# Patient Record
Sex: FEMALE | Race: WHITE | NOT HISPANIC OR LATINO | Employment: FULL TIME | URBAN - METROPOLITAN AREA
[De-identification: names, ages, dates, MRNs, and addresses within clinical notes are randomized per-mention and may not be internally consistent; named-entity substitution may affect disease eponyms.]

---

## 2018-07-02 ENCOUNTER — APPOINTMENT (OUTPATIENT)
Dept: CT IMAGING | Facility: HOSPITAL | Age: 28
End: 2018-07-02

## 2018-07-02 ENCOUNTER — HOSPITAL ENCOUNTER (EMERGENCY)
Facility: HOSPITAL | Age: 28
Discharge: HOME | End: 2018-07-02
Attending: EMERGENCY MEDICINE | Admitting: EMERGENCY MEDICINE

## 2018-07-02 VITALS
SYSTOLIC BLOOD PRESSURE: 117 MMHG | DIASTOLIC BLOOD PRESSURE: 78 MMHG | RESPIRATION RATE: 16 BRPM | TEMPERATURE: 99.3 F | BODY MASS INDEX: 27.74 KG/M2 | HEART RATE: 94 BPM | WEIGHT: 183 LBS | OXYGEN SATURATION: 95 % | HEIGHT: 68 IN

## 2018-07-02 DIAGNOSIS — G43.009 MIGRAINE WITHOUT AURA AND WITHOUT STATUS MIGRAINOSUS, NOT INTRACTABLE: Primary | ICD-10-CM

## 2018-07-02 LAB
HOLD SPECIMEN: NORMAL
HOLD SPECIMEN: NORMAL
WHOLE BLOOD HOLD SPECIMEN: NORMAL
WHOLE BLOOD HOLD SPECIMEN: NORMAL

## 2018-07-02 PROCEDURE — 70450 CT HEAD/BRAIN W/O DYE: CPT

## 2018-07-02 PROCEDURE — 25010000002 KETOROLAC TROMETHAMINE PER 15 MG: Performed by: PHYSICIAN ASSISTANT

## 2018-07-02 PROCEDURE — 99284 EMERGENCY DEPT VISIT MOD MDM: CPT

## 2018-07-02 PROCEDURE — 96375 TX/PRO/DX INJ NEW DRUG ADDON: CPT

## 2018-07-02 PROCEDURE — 25010000002 METOCLOPRAMIDE PER 10 MG: Performed by: PHYSICIAN ASSISTANT

## 2018-07-02 PROCEDURE — 96374 THER/PROPH/DIAG INJ IV PUSH: CPT

## 2018-07-02 PROCEDURE — 25010000002 DIPHENHYDRAMINE PER 50 MG: Performed by: PHYSICIAN ASSISTANT

## 2018-07-02 PROCEDURE — 25010000002 DEXAMETHASONE: Performed by: PHYSICIAN ASSISTANT

## 2018-07-02 RX ORDER — METOCLOPRAMIDE HYDROCHLORIDE 5 MG/ML
10 INJECTION INTRAMUSCULAR; INTRAVENOUS ONCE
Status: COMPLETED | OUTPATIENT
Start: 2018-07-02 | End: 2018-07-02

## 2018-07-02 RX ORDER — DIPHENHYDRAMINE HYDROCHLORIDE 50 MG/ML
25 INJECTION INTRAMUSCULAR; INTRAVENOUS ONCE
Status: COMPLETED | OUTPATIENT
Start: 2018-07-02 | End: 2018-07-02

## 2018-07-02 RX ORDER — KETOROLAC TROMETHAMINE 30 MG/ML
30 INJECTION, SOLUTION INTRAMUSCULAR; INTRAVENOUS ONCE
Status: COMPLETED | OUTPATIENT
Start: 2018-07-02 | End: 2018-07-02

## 2018-07-02 RX ADMIN — DIPHENHYDRAMINE HYDROCHLORIDE 25 MG: 50 INJECTION INTRAMUSCULAR; INTRAVENOUS at 12:59

## 2018-07-02 RX ADMIN — KETOROLAC TROMETHAMINE 30 MG: 30 INJECTION, SOLUTION INTRAMUSCULAR at 13:00

## 2018-07-02 RX ADMIN — METOCLOPRAMIDE 10 MG: 5 INJECTION, SOLUTION INTRAMUSCULAR; INTRAVENOUS at 13:01

## 2018-07-02 RX ADMIN — DEXAMETHASONE SODIUM PHOSPHATE 10 MG: 10 INJECTION INTRAMUSCULAR; INTRAVENOUS at 13:04

## 2018-07-02 RX ADMIN — SODIUM CHLORIDE 1000 ML: 9 INJECTION, SOLUTION INTRAVENOUS at 12:58

## 2018-07-02 NOTE — ED PROVIDER NOTES
Subjective   Patient comes emergency part today complaining having a headache.  Patient reports the headache started about 2 days ago.  As kept her from sleeping at night.  She went to the [facility] was sent to the emergency department for evaluation.  Patient has had some nausea no vomiting.  She's had no photophobia and no phonophobia.  Patient has no other complaints.  Patient has had no prior history of migraine headaches.  She's had no head trauma.  No fevers no chills.  No other complaints        History provided by:  Patient   used: No    Migraine   Pain location:  Frontal  Quality:  Dull  Radiates to:  Does not radiate  Severity currently:  9/10  Severity at highest:  9/10  Onset quality:  Gradual  Duration:  3 days  Timing:  Constant  Progression:  Worsening  Chronicity:  New  Similar to prior headaches: no    Context: activity    Context: not caffeine, not stress, not exposure to cold air, not loud noise and not straining    Relieved by:  Nothing  Worsened by:  Activity and sound  Ineffective treatments:  None tried  Associated symptoms: no abdominal pain, no blurred vision, no congestion, no cough, no diarrhea, no dizziness, no drainage, no fatigue, no focal weakness, no hearing loss, no neck pain, no numbness, no paresthesias, no photophobia, no sinus pressure, no swollen glands and no weakness        Review of Systems   Constitutional: Negative for fatigue.   HENT: Negative for congestion, hearing loss, postnasal drip and sinus pressure.    Eyes: Negative for blurred vision and photophobia.   Respiratory: Negative for cough.    Gastrointestinal: Negative for abdominal pain and diarrhea.   Genitourinary: Negative for dysuria and urgency.   Musculoskeletal: Negative for neck pain.   Neurological: Negative for dizziness, focal weakness, weakness, numbness and paresthesias.   Hematological: Negative for adenopathy.   Psychiatric/Behavioral: Negative.    All other systems reviewed and are  negative.      History reviewed. No pertinent past medical history.    No Known Allergies    History reviewed. No pertinent surgical history.    History reviewed. No pertinent family history.    Social History     Social History   • Marital status:      Social History Main Topics   • Smoking status: Current Every Day Smoker     Packs/day: 0.50     Types: Cigarettes   • Alcohol use No   • Drug use: No   • Sexual activity: Yes     Birth control/ protection: None     Other Topics Concern   • Not on file           Objective   Physical Exam   Constitutional: She is oriented to person, place, and time. She appears well-developed and well-nourished.   HENT:   Head: Normocephalic and atraumatic.   Right Ear: External ear normal.   Left Ear: External ear normal.   Nose: Nose normal.   Mouth/Throat: Oropharynx is clear and moist.   Eyes: Conjunctivae and EOM are normal. Pupils are equal, round, and reactive to light. No scleral icterus.   Neck: Normal range of motion. No thyromegaly present.   Cardiovascular: Normal rate, regular rhythm and normal heart sounds.    Pulmonary/Chest: Effort normal and breath sounds normal. No respiratory distress. She has no wheezes. She has no rales. She exhibits no tenderness.   Abdominal: Soft. Bowel sounds are normal. She exhibits no distension. There is no tenderness.   Musculoskeletal: Normal range of motion.   Lymphadenopathy:     She has no cervical adenopathy.   Neurological: She is alert and oriented to person, place, and time. She has normal reflexes. She displays normal reflexes. No cranial nerve deficit. Coordination normal.   Skin: Skin is warm and dry. Capillary refill takes less than 2 seconds.   Psychiatric: She has a normal mood and affect. Her behavior is normal. Judgment and thought content normal.   Nursing note and vitals reviewed.      Procedures           ED Course  ED Course as of Jul 07 1301   Mon Jul 02, 2018   1411 Patient has improved headache.  Since she like  to be discharged discharged home.  Discussed the CT scan findings.  [ELIZA]      ED Course User Index  [ELIZA] MICHELLE Walsh                  MDM  Number of Diagnoses or Management Options  Migraine without aura and without status migrainosus, not intractable: new and requires workup     Amount and/or Complexity of Data Reviewed  Clinical lab tests: reviewed and ordered  Tests in the radiology section of CPT®: reviewed and ordered  Tests in the medicine section of CPT®: ordered and reviewed  Discuss the patient with other providers: yes    Patient Progress  Patient progress: stable        Final diagnoses:   Migraine without aura and without status migrainosus, not intractable            MICHELLE Walsh  07/07/18 6333

## 2018-07-02 NOTE — DISCHARGE INSTRUCTIONS
Follow up with one of the [facility] physician groups below to setup primary care. If you have trouble following up, please call Fany Lynn, our transitional care nurse, at [phone].    (Dr. Edgar, Dr. Salguero, Dr. Corea, and Dr. Bull.)  [facility], Primary Care, [phone], [address], [place] [address]    [facility], Primary Care, [phone], [address] [place], [address] [facility]) [facility], Primary Care, [phone], [address], [address] [place], [address] [facility], Primary Care, [phone], [address], [address] [place], [address]     [place] [address] [facility], Primary Care, [phone], [address], [address] [place], [address]    [place] [address] [facility], Primary Care, [phone], [address]    [place], [address] [facility], Primary Care, [phone], [address], [address] [place], [address] [facility], Primary Care, [phone], [address], [address] [place], [address] [facility]) [facility], Primary Care, [phone], [address], [address] [place], [address] [facility], Primary Care, [phone], [address], [address] [place], [address] [facility], Primary Care, [phone], [address], [place], [address]     [facility], Primary Care, [phone], [address] [id number]

## 2020-03-01 ENCOUNTER — OFFICE VISIT (OUTPATIENT)
Dept: RETAIL CLINIC | Facility: CLINIC | Age: 30
End: 2020-03-01

## 2020-03-01 VITALS
OXYGEN SATURATION: 98 % | HEART RATE: 91 BPM | RESPIRATION RATE: 15 BRPM | BODY MASS INDEX: 30.92 KG/M2 | DIASTOLIC BLOOD PRESSURE: 80 MMHG | TEMPERATURE: 97.9 F | HEIGHT: 68 IN | SYSTOLIC BLOOD PRESSURE: 124 MMHG | WEIGHT: 204 LBS

## 2020-03-01 DIAGNOSIS — H92.01 RIGHT EAR PAIN: ICD-10-CM

## 2020-03-01 DIAGNOSIS — Z87.09 HISTORY OF INFLUENZA: Primary | ICD-10-CM

## 2020-03-01 PROCEDURE — 99213 OFFICE O/P EST LOW 20 MIN: CPT | Performed by: NURSE PRACTITIONER

## 2020-03-01 RX ORDER — OSELTAMIVIR PHOSPHATE 75 MG/1
CAPSULE ORAL
COMMUNITY
Start: 2020-02-28

## 2020-03-01 RX ORDER — AZITHROMYCIN 250 MG/1
TABLET, FILM COATED ORAL
Qty: 6 TABLET | Refills: 0 | Status: SHIPPED | OUTPATIENT
Start: 2020-03-01

## 2020-03-01 RX ORDER — PREDNISONE 10 MG/1
TABLET ORAL
Qty: 21 TABLET | Refills: 0 | Status: SHIPPED | OUTPATIENT
Start: 2020-03-01

## 2020-03-01 NOTE — PATIENT INSTRUCTIONS
Earache, Adult  An earache, or ear pain, can be caused by many things, including:  · An infection.  · Ear wax buildup.  · Ear pressure.  · Something in the ear that should not be there (foreign body).  · A sore throat.  · Tooth problems.  · Jaw problems.  Treatment of the earache will depend on the cause. If the cause is not clear or cannot be determined, you may need to watch your symptoms until your earache goes away or until a cause is found.  Follow these instructions at home:  Pay attention to any changes in your symptoms. Take these actions to help with your pain:  · Take or apply over-the-counter and prescription medicines only as told by your health care provider.  · If you were prescribed an antibiotic medicine, use it as told by your health care provider. Do not stop using the antibiotic even if you start to feel better.  · Do not put anything in your ear other than medicine that is prescribed by your health care provider.  · If directed, apply heat to the affected area as often as told by your health care provider. Use the heat source that your health care provider recommends, such as a moist heat pack or a heating pad.  ? Place a towel between your skin and the heat source.  ? Leave the heat on for 20-30 minutes.  ? Remove the heat if your skin turns bright red. This is especially important if you are unable to feel pain, heat, or cold. You may have a greater risk of getting burned.  · If directed, put ice on the ear:  ? Put ice in a plastic bag.  ? Place a towel between your skin and the bag.  ? Leave the ice on for 20 minutes, 2-3 times a day.  · Try resting in an upright position instead of lying down. This may help to reduce pressure in your ear and relieve pain.  · Chew gum if it helps to relieve your ear pain.  · Treat any allergies as told by your health care provider.  · Keep all follow-up visits as told by your health care provider. This is important.  Contact a health care provider if:  · Your  "pain does not improve within 2 days.  · Your earache gets worse.  · You have new symptoms.  · You have a fever.  Get help right away if:  · You have a severe headache.  · You have a stiff neck.  · You have trouble swallowing.  · You have redness or swelling behind your ear.  · You have fluid or blood coming from your ear.  · You have hearing loss.  · You feel dizzy.  This information is not intended to replace advice given to you by your health care provider. Make sure you discuss any questions you have with your health care provider.  Document Released: 08/04/2005 Document Revised: 08/15/2017 Document Reviewed: 06/12/2017  [employer] Interactive Patient Education © 2020 [employer]    Influenza, Adult  Influenza, more commonly known as \"the flu,\" is a viral infection that mainly affects the respiratory tract. The respiratory tract includes organs that help you breathe, such as the lungs, nose, and throat. The flu causes many symptoms similar to the common cold along with high fever and body aches.  The flu spreads easily from person to person (is contagious). Getting a flu shot (influenza vaccination) every year is the best way to prevent the flu.  What are the causes?  This condition is caused by the influenza virus. You can get the virus by:  · Breathing in droplets that are in the air from an infected person's cough or sneeze.  · Touching something that has been exposed to the virus (has been contaminated) and then touching your mouth, nose, or eyes.  What increases the risk?  The following factors may make you more likely to get the flu:  · Not washing or sanitizing your hands often.  · Having close contact with many people during cold and flu season.  · Touching your mouth, eyes, or nose without first washing or sanitizing your hands.  · Not getting a yearly (annual) flu shot.  You may have a higher risk for the flu, including serious problems such as a lung infection (pneumonia), if you:  · Are older than " 65.  · Are pregnant.  · Have a weakened disease-fighting system (immune system). You may have a weakened immune system if you:  ? Have HIV or AIDS.  ? Are undergoing chemotherapy.  ? Are taking medicines that reduce (suppress) the activity of your immune system.  · Have a long-term (chronic) illness, such as heart disease, kidney disease, diabetes, or lung disease.  · Have a liver disorder.  · Are severely overweight (morbidly obese).  · Have anemia. This is a condition that affects your red blood cells.  · Have asthma.  What are the signs or symptoms?  Symptoms of this condition usually begin suddenly and last 4-14 days. They may include:  · Fever and chills.  · Headaches, body aches, or muscle aches.  · Sore throat.  · Cough.  · Runny or stuffy (congested) nose.  · Chest discomfort.  · Poor appetite.  · Weakness or fatigue.  · Dizziness.  · Nausea or vomiting.  How is this diagnosed?  This condition may be diagnosed based on:  · Your symptoms and medical history.  · A physical exam.  · Swabbing your nose or throat and testing the fluid for the influenza virus.  How is this treated?  If the flu is diagnosed early, you can be treated with medicine that can help reduce how severe the illness is and how long it lasts (antiviral medicine). This may be given by mouth (orally) or through an IV.  Taking care of yourself at home can help relieve symptoms. Your health care provider may recommend:  · Taking over-the-counter medicines.  · Drinking plenty of fluids.  In many cases, the flu goes away on its own. If you have severe symptoms or complications, you may be treated in a hospital.  Follow these instructions at home:  Activity  · Rest as needed and get plenty of sleep.  · Stay home from work or school as told by your health care provider. Unless you are visiting your health care provider, avoid leaving home until your fever has been gone for 24 hours without taking medicine.  Eating and drinking  · Take an oral  rehydration solution (ORS). This is a drink that is sold at pharmacies and retail stores.  · Drink enough fluid to keep your urine pale yellow.  · Drink clear fluids in small amounts as you are able. Clear fluids include water, ice chips, diluted fruit juice, and low-calorie sports drinks.  · Eat bland, easy-to-digest foods in small amounts as you are able. These foods include bananas, applesauce, rice, lean meats, toast, and crackers.  · Avoid drinking fluids that contain a lot of sugar or caffeine, such as energy drinks, regular sports drinks, and soda.  · Avoid alcohol.  · Avoid spicy or fatty foods.  General instructions         · Take over-the-counter and prescription medicines only as told by your health care provider.  · Use a cool mist humidifier to add humidity to the air in your home. This can make it easier to breathe.  · Cover your mouth and nose when you cough or sneeze.  · Wash your hands with soap and water often, especially after you cough or sneeze. If soap and water are not available, use alcohol-based hand .  · Keep all follow-up visits as told by your health care provider. This is important.  How is this prevented?    · Get an annual flu shot. You may get the flu shot in late summer, fall, or winter. Ask your health care provider when you should get your flu shot.  · Avoid contact with people who are sick during cold and flu season. This is generally fall and winter.  Contact a health care provider if:  · You develop new symptoms.  · You have:  ? Chest pain.  ? Diarrhea.  ? A fever.  · Your cough gets worse.  · You produce more mucus.  · You feel nauseous or you vomit.  Get help right away if:  · You develop shortness of breath or difficulty breathing.  · Your skin or nails turn a bluish color.  · You have severe pain or stiffness in your neck.  · You develop a sudden headache or sudden pain in your face or ear.  · You cannot eat or drink without vomiting.  Summary  · Influenza, more  "commonly known as \"the flu,\" is a viral infection that primarily affects your respiratory tract.  · Symptoms of the flu usually begin suddenly and last 4-14 days.  · Getting an annual flu shot is the best way to prevent getting the flu.  · Stay home from work or school as told by your health care provider. Unless you are visiting your health care provider, avoid leaving home until your fever has been gone for 24 hours without taking medicine.  · Keep all follow-up visits as told by your health care provider. This is important.  This information is not intended to replace advice given to you by your health care provider. Make sure you discuss any questions you have with your health care provider.  Document Released: 12/15/2001 Document Revised: 06/05/2019 Document Reviewed: 06/05/2019  [employer] Interactive Patient Education © 2020 [employer]    "

## 2020-03-01 NOTE — PROGRESS NOTES
"Subjective   Adelina Dennis is a 29 y.o. female.   /80   Pulse 91   Temp 97.9 °F (36.6 °C)   Resp 15   Ht 172.7 cm (68\")   Wt 92.5 kg (204 lb)   LMP 02/02/2020   SpO2 98%   BMI 31.02 kg/m²   History reviewed. No pertinent past medical history.  No Known Allergies      Earache    There is pain in the right ear. This is a new problem. Episode onset: 2 days ago. The problem has been rapidly worsening. The pain is moderate. Associated symptoms include coughing (mild, non productive), headaches and rhinorrhea (green color, thick). Pertinent negatives include no abdominal pain, diarrhea, ear discharge, hearing loss, neck pain, rash, sore throat or vomiting.        The following portions of the patient's history were reviewed and updated as appropriate: allergies, current medications, past family history, past medical history, past social history, past surgical history and problem list.    Review of Systems   Constitutional: Positive for fatigue and fever (resolved).   HENT: Positive for ear pain and rhinorrhea (green color, thick). Negative for ear discharge, hearing loss and sore throat.    Respiratory: Positive for cough (mild, non productive).    Gastrointestinal: Negative for abdominal pain, diarrhea and vomiting.   Musculoskeletal: Negative for neck pain.   Skin: Negative for rash.   Neurological: Positive for headaches.   + test for influenza 3 days ago, currently on tamiflu    Objective   Physical Exam   Constitutional: She appears well-developed and well-nourished.  Non-toxic appearance. She appears ill (mild).   HENT:   Head: Normocephalic and atraumatic.   Right Ear: Tympanic membrane and ear canal normal.   Left Ear: Tympanic membrane and ear canal normal.   Nose: Mucosal edema and rhinorrhea present. Right sinus exhibits no maxillary sinus tenderness and no frontal sinus tenderness. Left sinus exhibits no maxillary sinus tenderness and no frontal sinus tenderness.   Mouth/Throat: Uvula is " midline. Posterior oropharyngeal erythema present. No tonsillar exudate.   Cardiovascular: Regular rhythm and normal heart sounds.   Pulmonary/Chest: Effort normal. She has no wheezes. She has rhonchi. She has no rales.   Lymphadenopathy:     She has no cervical adenopathy.   Skin: Skin is warm and dry.       Assessment/Plan   Adelina was seen today for earache.    Diagnoses and all orders for this visit:    History of influenza    Right ear pain    Other orders  -     predniSONE (DELTASONE) 10 MG tablet; 6/5/4/3/2/1 As directed PO  -     azithromycin (ZITHROMAX Z-THERESA) 250 MG tablet; Take 2 tablets the first day, then 1 tablet daily for 4 days.      Pt advised to start the prednisone today. ONLY start the antibiotic is symptoms linger and worsen after 3 days on steroid. Pt states understanding.

## 2025-02-14 ENCOUNTER — OFFICE VISIT (OUTPATIENT)
Dept: FAMILY MEDICINE CLINIC | Facility: CLINIC | Age: 35
End: 2025-02-14
Payer: COMMERCIAL

## 2025-02-14 VITALS
HEART RATE: 98 BPM | WEIGHT: 157.6 LBS | HEIGHT: 68 IN | SYSTOLIC BLOOD PRESSURE: 122 MMHG | DIASTOLIC BLOOD PRESSURE: 78 MMHG | TEMPERATURE: 97.9 F | OXYGEN SATURATION: 98 % | BODY MASS INDEX: 23.89 KG/M2 | RESPIRATION RATE: 18 BRPM

## 2025-02-14 DIAGNOSIS — Z76.89 ENCOUNTER TO ESTABLISH CARE: ICD-10-CM

## 2025-02-14 DIAGNOSIS — Z00.00 ANNUAL PHYSICAL EXAM: Primary | ICD-10-CM

## 2025-02-14 PROCEDURE — 99385 PREV VISIT NEW AGE 18-39: CPT | Performed by: NURSE PRACTITIONER

## 2025-02-14 PROCEDURE — 2014F MENTAL STATUS ASSESS: CPT | Performed by: NURSE PRACTITIONER

## 2025-02-14 PROCEDURE — 1160F RVW MEDS BY RX/DR IN RCRD: CPT | Performed by: NURSE PRACTITIONER

## 2025-02-14 PROCEDURE — 1159F MED LIST DOCD IN RCRD: CPT | Performed by: NURSE PRACTITIONER

## 2025-02-14 NOTE — PROGRESS NOTES
"    Annual Physical     Name: Adelina Dennis    : 1990     MRN: [id number]     Chief Complaint  Annual Exam    Subjective     History of Present Illness:  Adelina Dennis is a 34 y.o. female who presents today to establish care with annual physical exam.  Patient has no pre-existing conditions.  Over the last several years she has had a total weight loss of approximately 75 pounds through dietary modification and daily exercise.  She was a former smoker, achieving cessation 4 years ago.  Patient states up-to-date with annual cervical cancer screening through Pap smear.  Does not have any high risk familial cancers, no ovarian, uterine, breast or colon cancer risk.  She has no complaints or concerns today    The patient is being seen for a health maintenance evaluation.    Social History  Tobacco Use: Medium Risk (2025)    Patient History     Smoking Tobacco Use: Former     Smokeless Tobacco Use: Never     Passive Exposure: Not on file     Social History     Socioeconomic History    Marital status:    Tobacco Use    Smoking status: Former     Average packs/day: 0.5 packs/day for 11.0 years (5.5 ttl pk-yrs)     Types: Cigarettes     Start date: 3/11/2010    Smokeless tobacco: Never   Vaping Use    Vaping status: Never Used   Substance and Sexual Activity    Alcohol use: No    Drug use: No    Sexual activity: Yes     Birth control/protection: None       General History  Adelina  does have regular dental visits.  She does not complain of vision problems. Last eye exam was May 2024.  Immunizations are up to date.  Lifestyle  Adelina  consumes in general, a \"healthy\" diet  .  She exercises daily.    Reproductive Health  She reports periods are regular every 28-30 days.  She is sexually active. Her contraceptive plan is no method.    Screening  Last pap was   Last Completed Pap Smear            PAP SMEAR (Every 3 Years) Next due on 2027  Done                . " History of abnormal pap smear or family history of gyn cancer: no  Last mammogram was   Last Completed Mammogram       This patient has no relevant Health Maintenance data.        . Personal or family history of abnormal mammograms or breast cancer: no  Last colonoscopy was   Last Completed Colonoscopy       This patient has no relevant Health Maintenance data.        . Family history of colon cancer: no      Health Maintenance Summary            Ordered - HEPATITIS C SCREENING (Once) Ordered on 2/14/2025      No completion, postpone, or frequency change history exists for this topic.              Overdue - ANNUAL PHYSICAL (Yearly) Never done      No completion, postpone, or frequency change history exists for this topic.              Postponed - INFLUENZA VACCINE (Yearly - July to March) Postponed until 3/31/2025      02/14/2025  Postponed until 3/31/2025 by Debbie Bay MA (Patient Refused)    09/30/2022  Imm Admin: Influenza Injectable Mdck Pf Quad    10/06/2020  Imm Admin: Fluzone (or Fluarix & Flulaval for VFC) >6mos    09/27/2019  Imm Admin: Fluzone (or Fluarix & Flulaval for VFC) >6mos    11/02/2017  Imm Admin: Influenza Seasonal Injectable    Only the first 5 history entries have been loaded, but more history exists.              Postponed - COVID-19 Vaccine (1 - 2024-25 season) Postponed until 2/4/2026 02/14/2025  Postponed until 2/4/2026 by Debbie Bay MA (Patient Refused)              TDAP/TD VACCINES (3 - Td or Tdap) Next due on 8/9/2027 08/09/2017  Imm Admin: Tdap    08/19/2016  Imm Admin: Tdap              PAP SMEAR (Every 3 Years) Next due on 12/13/2027 12/13/2024  Done              Pneumococcal Vaccine 0-49 (Series Information) Aged Out      No completion, postpone, or frequency change history exists for this topic.                  Immunization History   Administered Date(s) Administered    Fluzone (or Fluarix & Flulaval for VFC) >6mos 09/02/2016, 09/27/2019, 10/06/2020     "Hepatitis A 04/16/2019    Influenza Injectable Mdck Pf Quad 09/30/2022    Influenza Seasonal Injectable 11/02/2017    Tdap 08/19/2016, 08/09/2017       Review of Systems   Constitutional:  Negative for fatigue.   Respiratory:  Negative for cough and shortness of breath.    Cardiovascular:  Negative for chest pain, palpitations and leg swelling.   Gastrointestinal:  Negative for abdominal pain, constipation, diarrhea, nausea and vomiting.   Musculoskeletal:  Negative for arthralgias and myalgias.   Neurological:  Negative for dizziness, weakness, light-headedness and headaches.   Hematological:  Does not bruise/bleed easily.   Psychiatric/Behavioral:  Negative for agitation, self-injury, sleep disturbance and suicidal ideas. The patient is not nervous/anxious.        Objective     Vital Signs  /78 (BP Location: Left arm, Patient Position: Sitting, Cuff Size: Adult)   Pulse 98   Temp 97.9 °F (36.6 °C) (Temporal)   Resp 18   Ht 172.7 cm (68\")   Wt 71.5 kg (157 lb 9.6 oz)   SpO2 98%   BMI 23.96 kg/m²   Estimated body mass index is 23.96 kg/m² as calculated from the following:    Height as of this encounter: 172.7 cm (68\").    Weight as of this encounter: 71.5 kg (157 lb 9.6 oz).    BMI is within normal parameters. No other follow-up for BMI required.      Physical Exam  Vitals reviewed.   Constitutional:       Appearance: Normal appearance.   HENT:      Head: Normocephalic and atraumatic.      Right Ear: Tympanic membrane, ear canal and external ear normal.      Left Ear: Ear canal and external ear normal.      Nose: Nose normal.      Mouth/Throat:      Mouth: Mucous membranes are moist.      Pharynx: Oropharynx is clear.   Eyes:      Conjunctiva/sclera: Conjunctivae normal.      Pupils: Pupils are equal, round, and reactive to light.   Cardiovascular:      Rate and Rhythm: Normal rate and regular rhythm.      Pulses: Normal pulses.      Heart sounds: Normal heart sounds.   Pulmonary:      Effort: Pulmonary " effort is normal.      Breath sounds: Normal breath sounds.   Abdominal:      General: Bowel sounds are normal.      Palpations: Abdomen is soft.      Tenderness: There is abdominal tenderness. There is no guarding.   Genitourinary:     Comments: Defer to GYN  Musculoskeletal:         General: Normal range of motion.      Cervical back: Neck supple.   Skin:     General: Skin is warm and dry.   Neurological:      Mental Status: She is alert and oriented to person, place, and time.   Psychiatric:         Mood and Affect: Mood normal.         Behavior: Behavior normal.          Assessment and Plan     Diagnoses and all orders for this visit:    1. Annual physical exam (Primary)  Assessment & Plan:  Patient not fasting today, will return on Monday for comprehensive lab work including CBC, CMP, TSH with reflex T4 and lipid panel.  Patient has excellent blood pressure, 122/78 in office today.  BMI is normal at 23.9.  Patient exercises daily both weightlifting and cardio.  She eats a predominantly healthy diet.  He has no concerns or complaints today    Orders:  -     CBC & Differential; Future  -     Comprehensive Metabolic Panel; Future  -     Lipid Panel; Future  -     Hepatitis C Antibody; Future  -     TSH Rfx On Abnormal To Free T4; Future    2. Encounter to establish care        Follow Up  Return in about 1 year (around 2/14/2026) for Annual physical.    Natalya Waddell, APRN

## 2025-02-14 NOTE — ASSESSMENT & PLAN NOTE
Patient not fasting today, will return on Monday for comprehensive lab work including CBC, CMP, TSH with reflex T4 and lipid panel.  Patient has excellent blood pressure, 122/78 in office today.  BMI is normal at 23.9.  Patient exercises daily both weightlifting and cardio.  She eats a predominantly healthy diet.  He has no concerns or complaints today

## 2025-02-17 ENCOUNTER — CLINICAL SUPPORT (OUTPATIENT)
Dept: FAMILY MEDICINE CLINIC | Facility: CLINIC | Age: 35
End: 2025-02-17
Payer: COMMERCIAL

## 2025-02-17 DIAGNOSIS — Z00.00 ANNUAL PHYSICAL EXAM: ICD-10-CM

## 2025-02-17 PROCEDURE — 36415 COLL VENOUS BLD VENIPUNCTURE: CPT | Performed by: NURSE PRACTITIONER

## 2025-02-18 LAB
ALBUMIN SERPL-MCNC: 4.3 G/DL (ref 3.9–4.9)
ALP SERPL-CCNC: 51 IU/L (ref 44–121)
ALT SERPL-CCNC: 15 IU/L (ref 0–32)
AST SERPL-CCNC: 17 IU/L (ref 0–40)
BASOPHILS # BLD AUTO: 0.1 X10E3/UL (ref 0–0.2)
BASOPHILS NFR BLD AUTO: 1 %
BILIRUB SERPL-MCNC: 0.3 MG/DL (ref 0–1.2)
BUN SERPL-MCNC: 14 MG/DL (ref 6–20)
BUN/CREAT SERPL: 16 (ref 9–23)
CALCIUM SERPL-MCNC: 8.9 MG/DL (ref 8.7–10.2)
CHLORIDE SERPL-SCNC: 105 MMOL/L (ref 96–106)
CHOLEST SERPL-MCNC: 142 MG/DL (ref 100–199)
CO2 SERPL-SCNC: 20 MMOL/L (ref 20–29)
CREAT SERPL-MCNC: 0.9 MG/DL (ref 0.57–1)
EGFRCR SERPLBLD CKD-EPI 2021: 87 ML/MIN/1.73
EOSINOPHIL # BLD AUTO: 0.4 X10E3/UL (ref 0–0.4)
EOSINOPHIL NFR BLD AUTO: 6 %
ERYTHROCYTE [DISTWIDTH] IN BLOOD BY AUTOMATED COUNT: 11.5 % (ref 11.7–15.4)
GLOBULIN SER CALC-MCNC: 1.9 G/DL (ref 1.5–4.5)
GLUCOSE SERPL-MCNC: 77 MG/DL (ref 70–99)
HCT VFR BLD AUTO: 42.3 % (ref 34–46.6)
HCV IGG SERPL QL IA: NON REACTIVE
HDLC SERPL-MCNC: 56 MG/DL
HGB BLD-MCNC: 13.8 G/DL (ref 11.1–15.9)
IMM GRANULOCYTES # BLD AUTO: 0 X10E3/UL (ref 0–0.1)
IMM GRANULOCYTES NFR BLD AUTO: 0 %
LDLC SERPL CALC-MCNC: 76 MG/DL (ref 0–99)
LYMPHOCYTES # BLD AUTO: 1.7 X10E3/UL (ref 0.7–3.1)
LYMPHOCYTES NFR BLD AUTO: 30 %
MCH RBC QN AUTO: 30.5 PG (ref 26.6–33)
MCHC RBC AUTO-ENTMCNC: 32.6 G/DL (ref 31.5–35.7)
MCV RBC AUTO: 93 FL (ref 79–97)
MONOCYTES # BLD AUTO: 0.4 X10E3/UL (ref 0.1–0.9)
MONOCYTES NFR BLD AUTO: 6 %
NEUTROPHILS # BLD AUTO: 3.1 X10E3/UL (ref 1.4–7)
NEUTROPHILS NFR BLD AUTO: 57 %
PLATELET # BLD AUTO: 223 X10E3/UL (ref 150–450)
POTASSIUM SERPL-SCNC: 4.3 MMOL/L (ref 3.5–5.2)
PROT SERPL-MCNC: 6.2 G/DL (ref 6–8.5)
RBC # BLD AUTO: 4.53 X10E6/UL (ref 3.77–5.28)
SODIUM SERPL-SCNC: 140 MMOL/L (ref 134–144)
TRIGL SERPL-MCNC: 46 MG/DL (ref 0–149)
TSH SERPL DL<=0.005 MIU/L-ACNC: 0.69 UIU/ML (ref 0.45–4.5)
VLDLC SERPL CALC-MCNC: 10 MG/DL (ref 5–40)
WBC # BLD AUTO: 5.6 X10E3/UL (ref 3.4–10.8)

## 2025-02-19 ENCOUNTER — TELEPHONE (OUTPATIENT)
Dept: FAMILY MEDICINE CLINIC | Facility: CLINIC | Age: 35
End: 2025-02-19
Payer: COMMERCIAL

## 2025-02-19 NOTE — TELEPHONE ENCOUNTER
----- Message from Natalya Waddell sent at 2/19/2025  9:02 AM EST -----  Please let Adelina know her labs look great

## 2025-02-20 ENCOUNTER — PATIENT ROUNDING (BHMG ONLY) (OUTPATIENT)
Dept: FAMILY MEDICINE CLINIC | Facility: CLINIC | Age: 35
End: 2025-02-20
Payer: COMMERCIAL

## 2025-02-20 NOTE — PROGRESS NOTES
.A [employer] message has been sent to the patient for patient rounding with [facility].

## 2025-08-18 ENCOUNTER — OFFICE VISIT (OUTPATIENT)
Dept: FAMILY MEDICINE CLINIC | Facility: CLINIC | Age: 35
End: 2025-08-18
Payer: COMMERCIAL

## 2025-08-18 VITALS
DIASTOLIC BLOOD PRESSURE: 98 MMHG | HEART RATE: 130 BPM | BODY MASS INDEX: 23.95 KG/M2 | SYSTOLIC BLOOD PRESSURE: 142 MMHG | WEIGHT: 158 LBS | TEMPERATURE: 99 F | HEIGHT: 68 IN | OXYGEN SATURATION: 98 %

## 2025-08-18 DIAGNOSIS — R03.0 ELEVATED BP WITHOUT DIAGNOSIS OF HYPERTENSION: ICD-10-CM

## 2025-08-18 DIAGNOSIS — F41.0 SEVERE ANXIETY WITH PANIC: ICD-10-CM

## 2025-08-18 DIAGNOSIS — R00.2 PALPITATIONS: Primary | ICD-10-CM

## 2025-08-18 PROCEDURE — 1160F RVW MEDS BY RX/DR IN RCRD: CPT | Performed by: NURSE PRACTITIONER

## 2025-08-18 PROCEDURE — 99214 OFFICE O/P EST MOD 30 MIN: CPT | Performed by: NURSE PRACTITIONER

## 2025-08-18 PROCEDURE — 1159F MED LIST DOCD IN RCRD: CPT | Performed by: NURSE PRACTITIONER

## 2025-08-18 RX ORDER — ALPRAZOLAM 0.25 MG
0.25 TABLET ORAL 2 TIMES DAILY PRN
Qty: 10 TABLET | Refills: 0 | Status: SHIPPED | OUTPATIENT
Start: 2025-08-18 | End: 2025-08-20 | Stop reason: SDUPTHER

## 2025-08-18 RX ORDER — SERTRALINE HYDROCHLORIDE 25 MG/1
25 TABLET, FILM COATED ORAL DAILY
Qty: 30 TABLET | Refills: 1 | Status: SHIPPED | OUTPATIENT
Start: 2025-08-18 | End: 2025-09-17

## 2025-08-19 LAB — TSH SERPL DL<=0.005 MIU/L-ACNC: 0.71 UIU/ML (ref 0.45–4.5)

## 2025-08-20 DIAGNOSIS — F41.0 SEVERE ANXIETY WITH PANIC: ICD-10-CM

## 2025-08-20 RX ORDER — ALPRAZOLAM 0.25 MG
0.25 TABLET ORAL 3 TIMES DAILY PRN
Qty: 20 TABLET | Refills: 0 | Status: SHIPPED | OUTPATIENT
Start: 2025-08-20 | End: 2025-08-21 | Stop reason: SDUPTHER

## 2025-08-21 DIAGNOSIS — F41.0 SEVERE ANXIETY WITH PANIC: ICD-10-CM

## 2025-08-21 RX ORDER — ALPRAZOLAM 0.25 MG
0.25 TABLET ORAL 3 TIMES DAILY PRN
Qty: 20 TABLET | Refills: 0 | Status: SHIPPED | OUTPATIENT
Start: 2025-08-21

## 2025-08-26 ENCOUNTER — OFFICE VISIT (OUTPATIENT)
Age: 35
End: 2025-08-26
Payer: COMMERCIAL

## 2025-08-26 VITALS — BODY MASS INDEX: 23.95 KG/M2 | HEIGHT: 68 IN | WEIGHT: 158 LBS

## 2025-08-26 DIAGNOSIS — F41.0 SEVERE ANXIETY WITH PANIC: Primary | ICD-10-CM

## 2025-08-26 RX ORDER — ALPRAZOLAM 0.25 MG
0.25 TABLET ORAL 4 TIMES DAILY PRN
Qty: 120 TABLET | Refills: 0 | Status: SHIPPED | OUTPATIENT
Start: 2025-08-26

## 2025-08-26 RX ORDER — PROPRANOLOL HYDROCHLORIDE 10 MG/1
10 TABLET ORAL 3 TIMES DAILY PRN
Qty: 90 TABLET | Refills: 1 | Status: SHIPPED | OUTPATIENT
Start: 2025-08-26

## 2025-08-28 ENCOUNTER — CLINICAL SUPPORT (OUTPATIENT)
Dept: FAMILY MEDICINE CLINIC | Facility: CLINIC | Age: 35
End: 2025-08-28
Payer: COMMERCIAL

## 2025-08-28 ENCOUNTER — TELEPHONE (OUTPATIENT)
Dept: FAMILY MEDICINE CLINIC | Facility: CLINIC | Age: 35
End: 2025-08-28
Payer: COMMERCIAL

## 2025-08-28 DIAGNOSIS — F41.9 ANXIETY: ICD-10-CM

## 2025-08-28 DIAGNOSIS — F41.9 ANXIETY: Primary | ICD-10-CM

## 2025-08-28 PROCEDURE — 36415 COLL VENOUS BLD VENIPUNCTURE: CPT | Performed by: NURSE PRACTITIONER

## 2025-08-28 RX ORDER — TRAZODONE HYDROCHLORIDE 50 MG/1
50 TABLET ORAL NIGHTLY
Qty: 30 TABLET | Refills: 1 | Status: SHIPPED | OUTPATIENT
Start: 2025-08-28 | End: 2025-08-29

## 2025-08-29 ENCOUNTER — TELEPHONE (OUTPATIENT)
Dept: FAMILY MEDICINE CLINIC | Facility: CLINIC | Age: 35
End: 2025-08-29
Payer: COMMERCIAL

## 2025-08-29 RX ORDER — DOXEPIN HYDROCHLORIDE 10 MG/1
10 CAPSULE ORAL NIGHTLY
Qty: 30 CAPSULE | Refills: 1 | Status: SHIPPED | OUTPATIENT
Start: 2025-08-29